# Patient Record
(demographics unavailable — no encounter records)

---

## 2025-07-02 NOTE — HISTORY OF PRESENT ILLNESS
[de-identified] : This is a very nice  80 year old  female experiencing  pain in the bilateral knee which is severe in intensity and has been going on for at least 1 year now.  She has done well with gel injections (Supartz in particular).  She states today that she had an episode last week where she went to stand and felt sharp pain in the left knee.  She developed what appears to be a Baker's cyst which has resolved in the left knee.  She is doing much better today.   The patient denies any radiation of the pain to the feet and it is not associated with numbness, tingling, or weakness.

## 2025-07-02 NOTE — PHYSICAL EXAM
[de-identified] : Well developed, well nourished in no apparent distress, awake, alert and orientated to person, place and time. with appropriate mood and affect. Respirations are even and unlabored. Gait evaluation does reveal a limp. There is no inguinal adenopathy. The legs are well-perfused, without skin lesions, shows a grossly normal motor and sensory examination. Knee motion causes minimal pain. ROM of both knees are 0-120 degrees.  5 degrees varus The knees are stable within that range-of-motion to AP and ML stress. Muscle strength is normal. Pedal pulses are palpable.  [de-identified] :  Long standing  knee, AP knee, lateral knee, tunnel and patellar views of the both knees were ordered and taken in the office and demonstrate degenerative joint disease of the knee with joint space narrowing, osteophyte formation, and subchondral sclerosis.

## 2025-07-02 NOTE — DISCUSSION/SUMMARY
[de-identified] : The patient has bilat knee osteoarthritis.  An extensive discussion was conducted on the natural history of the disease and the variety of surgical and non-surgical options available to the patient including, but not limited to non-steroidal anti-inflammatory medications, steroid injections, physical therapy, maintenance of ideal body weight, and reduction of activity. I recommended a prescription of Mobic but the patient would prefer to use OTC NSAIDs at this time. The patient will schedule an appointment as needed.